# Patient Record
Sex: FEMALE | Race: WHITE | NOT HISPANIC OR LATINO | Employment: STUDENT | URBAN - METROPOLITAN AREA
[De-identification: names, ages, dates, MRNs, and addresses within clinical notes are randomized per-mention and may not be internally consistent; named-entity substitution may affect disease eponyms.]

---
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
## 2023-01-11 ENCOUNTER — TELEPHONE (OUTPATIENT)
Dept: DERMATOLOGY | Facility: CLINIC | Age: 18
End: 2023-01-11

## 2023-01-11 DIAGNOSIS — L21.9 SEBORRHEIC DERMATITIS: ICD-10-CM

## 2023-01-11 RX ORDER — KETOCONAZOLE 20 MG/ML
SHAMPOO TOPICAL
Qty: 120 ML | Refills: 0 | Status: SHIPPED | OUTPATIENT
Start: 2023-01-11

## 2023-01-11 NOTE — TELEPHONE ENCOUNTER
Patient left voicemail looking for refill on lidex and ketoconazole for the dermatitis on her scalp states ,edication did work well for her but the dermatitis has returned

## 2023-01-18 DIAGNOSIS — L21.9 SEBORRHEIC DERMATITIS: ICD-10-CM

## 2023-01-18 RX ORDER — FLUOCINONIDE TOPICAL SOLUTION USP, 0.05% 0.5 MG/ML
SOLUTION TOPICAL
Qty: 60 ML | Refills: 0 | Status: SHIPPED | OUTPATIENT
Start: 2023-01-18

## 2023-01-18 RX ORDER — FLUOCINONIDE TOPICAL SOLUTION USP, 0.05% 0.5 MG/ML
SOLUTION TOPICAL
Qty: 60 ML | Refills: 0 | OUTPATIENT
Start: 2023-01-18

## 2023-01-18 NOTE — TELEPHONE ENCOUNTER
Pt was provided the prescription for ketoconazole (NIZORAL) 2 % shampoo    However, the prescription was not provided for the fluocinonide (LIDEX) 0 05 % external solution    Please review  Thank you!

## 2023-01-18 NOTE — TELEPHONE ENCOUNTER
Pt is leaving back to college on Friday and could not get of hold of her dermatologist , pt left several messages for them to refill this rx and unable to contact them

## 2023-01-20 NOTE — TELEPHONE ENCOUNTER
Patient left another message for a refill of fluocinonide for her scalp  In looking into her chart since this was denied by provider it appears that patients family doctor filled the prescription for her

## 2023-12-26 DIAGNOSIS — L70.9 ACNE, UNSPECIFIED ACNE TYPE: Primary | ICD-10-CM

## 2023-12-26 RX ORDER — CLINDAMYCIN AND BENZOYL PEROXIDE 10; 50 MG/G; MG/G
GEL TOPICAL 2 TIMES DAILY
Qty: 50 G | Refills: 1 | Status: SHIPPED | OUTPATIENT
Start: 2023-12-26

## 2024-02-02 ENCOUNTER — OFFICE VISIT (OUTPATIENT)
Dept: FAMILY MEDICINE CLINIC | Facility: CLINIC | Age: 19
End: 2024-02-02
Payer: COMMERCIAL

## 2024-02-02 VITALS
SYSTOLIC BLOOD PRESSURE: 118 MMHG | WEIGHT: 184.6 LBS | TEMPERATURE: 98.6 F | BODY MASS INDEX: 27.98 KG/M2 | HEART RATE: 65 BPM | OXYGEN SATURATION: 97 % | HEIGHT: 68 IN | DIASTOLIC BLOOD PRESSURE: 66 MMHG

## 2024-02-02 DIAGNOSIS — Z00.00 ROUTINE ADULT HEALTH MAINTENANCE: ICD-10-CM

## 2024-02-02 DIAGNOSIS — K01.1 IMPACTED MOLAR: Primary | ICD-10-CM

## 2024-02-02 DIAGNOSIS — L70.9 ACNE, UNSPECIFIED ACNE TYPE: ICD-10-CM

## 2024-02-02 PROCEDURE — 99395 PREV VISIT EST AGE 18-39: CPT | Performed by: FAMILY MEDICINE

## 2024-02-02 PROCEDURE — 99214 OFFICE O/P EST MOD 30 MIN: CPT | Performed by: FAMILY MEDICINE

## 2024-02-02 RX ORDER — CLINDAMYCIN AND BENZOYL PEROXIDE 10; 50 MG/G; MG/G
GEL TOPICAL 2 TIMES DAILY
Qty: 50 G | Refills: 2 | Status: SHIPPED | OUTPATIENT
Start: 2024-02-02

## 2024-02-07 PROBLEM — L70.9 ACNE: Status: ACTIVE | Noted: 2024-02-07

## 2024-02-07 PROBLEM — K01.1 IMPACTED MOLAR: Status: ACTIVE | Noted: 2024-02-07

## 2024-02-07 NOTE — PROGRESS NOTES
Assessment/Plan:    17 y/o woman with: acne along with impacted molar and acne and annual well visit. Will refer to ENT. Will continue meds. Discussed supportive care and return parameters.     Regarding Annual well visit. Discussed various safety and health maintenance issues including healthy diet like the Mediterranean diet, exercise, ample sleep, stress reduction, and healthy weight as tolerated. Discussed supportive care and return parameters.     No problem-specific Assessment & Plan notes found for this encounter.       Diagnoses and all orders for this visit:    Impacted molar  -     Ambulatory Referral to Oral Maxillofacial Surgery; Future    Acne, unspecified acne type  -     clindamycin-benzoyl peroxide (BENZACLIN) gel; Apply topically 2 (two) times a day    Routine adult health maintenance          Subjective:     Chief Complaint   Patient presents with    Well Check     Annual physical. Patient is requesting an insurance referral to have wisdom teeth pulled.No further concerns, ng        Patient ID: Brii Villegas is a 18 y.o. female.    Patient is a 17 y/o woman who presents for follow-up on acne along with impacted molar and acne no fevers chills nausea or vomiting. Wants referral to oral surgeon, otherwise admits being stable on meds. Pt also here for annual well visit admits being active eats and sleeps well.        The following portions of the patient's history were reviewed and updated as appropriate: allergies, current medications, past family history, past medical history, past social history, past surgical history and problem list.    Review of Systems   Constitutional: Negative.    HENT: Negative.     Eyes: Negative.    Respiratory: Negative.     Cardiovascular: Negative.    Gastrointestinal: Negative.    Endocrine: Negative.    Genitourinary: Negative.    Musculoskeletal: Negative.    Allergic/Immunologic: Negative.    Neurological: Negative.    Hematological: Negative.   "  Psychiatric/Behavioral: Negative.     All other systems reviewed and are negative.        Objective:      /66 (BP Location: Right arm, Patient Position: Sitting, Cuff Size: Standard)   Pulse 65   Temp 98.6 °F (37 °C) (Temporal)   Ht 5' 8\" (1.727 m)   Wt 83.7 kg (184 lb 9.6 oz)   SpO2 97%   BMI 28.07 kg/m²          Physical Exam  Constitutional:       Appearance: She is well-developed.   HENT:      Head: Atraumatic.      Right Ear: External ear normal.      Left Ear: External ear normal.   Eyes:      Conjunctiva/sclera: Conjunctivae normal.      Pupils: Pupils are equal, round, and reactive to light.   Cardiovascular:      Rate and Rhythm: Normal rate and regular rhythm.      Heart sounds: Normal heart sounds.   Pulmonary:      Effort: Pulmonary effort is normal. No respiratory distress.      Breath sounds: Normal breath sounds.   Abdominal:      General: There is no distension.      Palpations: Abdomen is soft.      Tenderness: There is no abdominal tenderness. There is no guarding or rebound.   Musculoskeletal:         General: Normal range of motion.      Cervical back: Normal range of motion.   Skin:     General: Skin is warm and dry.   Neurological:      Mental Status: She is alert and oriented to person, place, and time.      Cranial Nerves: No cranial nerve deficit.   Psychiatric:         Behavior: Behavior normal.         Thought Content: Thought content normal.         Judgment: Judgment normal.         Depression Screening and Follow-up Plan: Patient was screened for depression during today's encounter. They screened negative with a PHQ-2 score of 0.      "

## 2024-02-21 PROBLEM — Z00.00 ROUTINE ADULT HEALTH MAINTENANCE: Status: RESOLVED | Noted: 2022-07-13 | Resolved: 2024-02-21

## 2024-03-15 ENCOUNTER — TELEPHONE (OUTPATIENT)
Age: 19
End: 2024-03-15

## 2024-03-15 NOTE — TELEPHONE ENCOUNTER
Patient called in stating that she has an upcoming appointment and was advised by her dentist that she would need an insurance referral I did ask patient to call them to confirm and get any information that is needed to process this  Patients appointment is on Friday march 22nd at 8 am     This is where she will be going   Michael Koenig DMD  Phone: [phone]  Fax: [phone]   [facility] [id number]

## 2024-06-17 ENCOUNTER — OFFICE VISIT (OUTPATIENT)
Dept: FAMILY MEDICINE CLINIC | Facility: CLINIC | Age: 19
End: 2024-06-17
Payer: COMMERCIAL

## 2024-06-17 ENCOUNTER — APPOINTMENT (OUTPATIENT)
Dept: RADIOLOGY | Facility: MEDICAL CENTER | Age: 19
End: 2024-06-17
Payer: COMMERCIAL

## 2024-06-17 VITALS
TEMPERATURE: 99.8 F | WEIGHT: 181 LBS | HEART RATE: 84 BPM | RESPIRATION RATE: 16 BRPM | DIASTOLIC BLOOD PRESSURE: 68 MMHG | BODY MASS INDEX: 27.43 KG/M2 | SYSTOLIC BLOOD PRESSURE: 118 MMHG | OXYGEN SATURATION: 98 % | HEIGHT: 68 IN

## 2024-06-17 DIAGNOSIS — R09.89 CHEST CONGESTION: Primary | ICD-10-CM

## 2024-06-17 DIAGNOSIS — J18.9 PNEUMONIA DUE TO INFECTIOUS ORGANISM, UNSPECIFIED LATERALITY, UNSPECIFIED PART OF LUNG: ICD-10-CM

## 2024-06-17 DIAGNOSIS — R09.81 SINUS CONGESTION: ICD-10-CM

## 2024-06-17 DIAGNOSIS — L23.7 POISON IVY DERMATITIS: ICD-10-CM

## 2024-06-17 PROBLEM — M25.562 LEFT KNEE PAIN: Status: RESOLVED | Noted: 2019-11-07 | Resolved: 2024-06-17

## 2024-06-17 PROBLEM — S06.0X1A CONCUSSION WITH LOSS OF CONSCIOUSNESS OF 30 MINUTES OR LESS: Status: RESOLVED | Noted: 2020-05-02 | Resolved: 2024-06-17

## 2024-06-17 PROBLEM — T14.90XA TRAUMA: Status: RESOLVED | Noted: 2020-05-02 | Resolved: 2024-06-17

## 2024-06-17 PROBLEM — K01.1 IMPACTED MOLAR: Status: RESOLVED | Noted: 2024-02-07 | Resolved: 2024-06-17

## 2024-06-17 PROBLEM — S09.90XA HEAD INJURY: Status: RESOLVED | Noted: 2020-05-02 | Resolved: 2024-06-17

## 2024-06-17 PROCEDURE — 99214 OFFICE O/P EST MOD 30 MIN: CPT | Performed by: STUDENT IN AN ORGANIZED HEALTH CARE EDUCATION/TRAINING PROGRAM

## 2024-06-17 PROCEDURE — 71046 X-RAY EXAM CHEST 2 VIEWS: CPT

## 2024-06-17 RX ORDER — CLOBETASOL PROPIONATE 0.05 MG/G
1 GEL TOPICAL 2 TIMES DAILY
Qty: 30 G | Refills: 0 | Status: SHIPPED | OUTPATIENT
Start: 2024-06-17

## 2024-06-17 RX ORDER — GUAIFENESIN 600 MG/1
1200 TABLET, EXTENDED RELEASE ORAL EVERY 12 HOURS SCHEDULED
Qty: 20 TABLET | Refills: 0 | Status: SHIPPED | OUTPATIENT
Start: 2024-06-17 | End: 2024-06-22

## 2024-06-17 RX ORDER — FLUTICASONE PROPIONATE 50 MCG
1 SPRAY, SUSPENSION (ML) NASAL DAILY
Qty: 16 G | Refills: 0 | Status: SHIPPED | OUTPATIENT
Start: 2024-06-17

## 2024-06-17 NOTE — PROGRESS NOTES
Ambulatory Visit  Name: Brii Villegas      : 2005      MRN: [id number]  Encounter Provider: Prabhu Mccabe MD  Encounter Date: 2024   Encounter department: [facility] (formerly [facility]    Assessment & Plan   1. Chest congestion  -     guaiFENesin (MUCINEX) 600 mg 12 hr tablet; Take 2 tablets (1,200 mg total) by mouth every 12 (twelve) hours for 5 days  2. Sinus congestion  -     fluticasone (FLONASE) 50 mcg/act nasal spray; 1 spray into each nostril daily  3. Pneumonia due to infectious organism, unspecified laterality, unspecified part of lung  -     XR chest pa & lateral; Future; Expected date: 2024  4. Poison ivy dermatitis  -     clobetasol (TEMOVATE) 0.05 % GEL; Apply 1 Application topically 2 (two) times a day       Patient likely dealing with postinfectious symptoms.  Recommended supportive care as indicated above.  Will obtain repeat chest x-ray to ensure resolution of pneumonia and follow-up results with patient.    Prescription for clobetasol gel sent to pharmacy for suspected poison ivy dermatitis.  Advised patient to follow-up with office if symptoms worsen or fail to improve.    History of Present Illness     Rash  This is a new problem. The current episode started 1 to 4 weeks ago. The problem is unchanged. The affected locations include the left lower leg, left upper leg, right lower leg and right upper leg. The problem is mild. The rash is characterized by itchiness and pain. Associated with: outdoors. The rash first occurred outside. Associated symptoms include congestion, coughing and itching. Pertinent negatives include no drinking less, diarrhea, rhinorrhea, sore throat or vomiting. There is no history of allergies or eczema. There were no sick contacts.   URI   This is a new problem. The current episode started 1 to 4 weeks ago. The problem has been gradually improving. There has been no fever. Associated symptoms include congestion, coughing, headaches  "and a rash. Pertinent negatives include no diarrhea, dysuria, ear pain, nausea, plugged ear sensation, rhinorrhea, sneezing, sore throat or vomiting. Associated symptoms comments: Postnasal drip. Treatments tried: course of antibiotics. The treatment provided moderate relief.       Review of Systems   HENT:  Positive for congestion. Negative for ear pain, rhinorrhea, sneezing and sore throat.    Respiratory:  Positive for cough.    Gastrointestinal:  Negative for diarrhea, nausea and vomiting.   Genitourinary:  Negative for dysuria.   Skin:  Positive for itching and rash.   Neurological:  Positive for headaches.       Objective     /68 (BP Location: Left arm, Patient Position: Sitting, Cuff Size: Large)   Pulse 84   Temp 99.8 °F (37.7 °C) (Tympanic)   Resp 16   Ht 5' 8\" (1.727 m)   Wt 82.1 kg (181 lb)   SpO2 98%   BMI 27.52 kg/m²     Physical Exam  Constitutional:       General: She is not in acute distress.     Appearance: Normal appearance. She is not ill-appearing.   HENT:      Head: Normocephalic and atraumatic.   Cardiovascular:      Rate and Rhythm: Normal rate and regular rhythm.      Heart sounds: Normal heart sounds. No murmur heard.  Pulmonary:      Effort: Pulmonary effort is normal. No respiratory distress.      Breath sounds: Normal breath sounds.   Abdominal:      Palpations: Abdomen is soft.      Tenderness: There is no abdominal tenderness.   Musculoskeletal:      Right lower leg: No edema.      Left lower leg: No edema.   Skin:     Findings: Rash (Poison ivy dermatitis scattered along the legs bilaterally) present.   Neurological:      Mental Status: She is alert.       Administrative Statements           "